# Patient Record
Sex: MALE | Race: WHITE | NOT HISPANIC OR LATINO | URBAN - METROPOLITAN AREA
[De-identification: names, ages, dates, MRNs, and addresses within clinical notes are randomized per-mention and may not be internally consistent; named-entity substitution may affect disease eponyms.]

---

## 2019-01-03 ENCOUNTER — OFFICE VISIT (OUTPATIENT)
Dept: OTOLARYNGOLOGY | Facility: CLINIC | Age: 49
End: 2019-01-03
Payer: COMMERCIAL

## 2019-01-03 VITALS — DIASTOLIC BLOOD PRESSURE: 88 MMHG | WEIGHT: 244.69 LBS | SYSTOLIC BLOOD PRESSURE: 128 MMHG | HEART RATE: 77 BPM

## 2019-01-03 DIAGNOSIS — J35.8 TONSILLAR MASS: ICD-10-CM

## 2019-01-03 DIAGNOSIS — R22.1 NECK MASS: Primary | ICD-10-CM

## 2019-01-03 PROCEDURE — 88342 IMHCHEM/IMCYTCHM 1ST ANTB: CPT | Performed by: PATHOLOGY

## 2019-01-03 PROCEDURE — 88342 CYTOLOGY SPECIMEN-FNA-PATHOLOGIST PERFORMED: ICD-10-PCS | Mod: 26,,, | Performed by: PATHOLOGY

## 2019-01-03 PROCEDURE — 88342 IMHCHEM/IMCYTCHM 1ST ANTB: CPT | Mod: 26,,, | Performed by: PATHOLOGY

## 2019-01-03 PROCEDURE — 31575 DIAGNOSTIC LARYNGOSCOPY: CPT | Mod: S$GLB,,, | Performed by: OTOLARYNGOLOGY

## 2019-01-03 PROCEDURE — 99999 PR PBB SHADOW E&M-NEW PATIENT-LVL III: ICD-10-PCS | Mod: PBBFAC,,, | Performed by: OTOLARYNGOLOGY

## 2019-01-03 PROCEDURE — 31575 PR LARYNGOSCOPY, FLEXIBLE; DIAGNOSTIC: ICD-10-PCS | Mod: S$GLB,,, | Performed by: OTOLARYNGOLOGY

## 2019-01-03 PROCEDURE — 99999 PR PBB SHADOW E&M-NEW PATIENT-LVL III: CPT | Mod: PBBFAC,,, | Performed by: OTOLARYNGOLOGY

## 2019-01-03 PROCEDURE — 99204 PR OFFICE/OUTPT VISIT, NEW, LEVL IV, 45-59 MIN: ICD-10-PCS | Mod: 25,S$GLB,, | Performed by: OTOLARYNGOLOGY

## 2019-01-03 PROCEDURE — 10021 FNA BX W/O IMG GDN 1ST LES: CPT | Mod: ,,, | Performed by: PATHOLOGY

## 2019-01-03 PROCEDURE — 88305 TISSUE EXAM BY PATHOLOGIST: CPT | Mod: 26,,, | Performed by: PATHOLOGY

## 2019-01-03 PROCEDURE — 88173 CYTOLOGY SPECIMEN-FNA-PATHOLOGIST PERFORMED: ICD-10-PCS | Mod: 26,,, | Performed by: PATHOLOGY

## 2019-01-03 PROCEDURE — 88305 TISSUE EXAM BY PATHOLOGIST: CPT | Performed by: PATHOLOGY

## 2019-01-03 PROCEDURE — 88173 CYTOPATH EVAL FNA REPORT: CPT | Mod: 26,,, | Performed by: PATHOLOGY

## 2019-01-03 PROCEDURE — 88305 CYTOLOGY SPECIMEN-FNA-PATHOLOGIST PERFORMED: ICD-10-PCS | Mod: 26,,, | Performed by: PATHOLOGY

## 2019-01-03 PROCEDURE — 99204 OFFICE O/P NEW MOD 45 MIN: CPT | Mod: 25,S$GLB,, | Performed by: OTOLARYNGOLOGY

## 2019-01-03 PROCEDURE — 10021 CYTOLOGY SPECIMEN-FNA-PATHOLOGIST PERFORMED: ICD-10-PCS | Mod: ,,, | Performed by: PATHOLOGY

## 2019-01-03 NOTE — PROCEDURES
The patient was examined and there was a palpable mass,rt neck    The patient was explained the nature of the procedure and risks, and a consent form was signed. At timeout was performed at 3:15    The skin was prepared with alcohol, and fine needle aspirate was performed. 2 passes were performed. Material was obtained, and results will follow in a separate report. The patient tolerated the procedure well.  Additional comments: none  Complications: none

## 2019-01-03 NOTE — PROGRESS NOTES
Chief Complaint   Patient presents with    Consult     tonsil CA       HPI   48 y.o. male presents from Dr. John Rockwell for evaluation of R cervical adenopathy and a R tonsil lesion.  He reports that he noted a neck mass several weeks ago.  He denies pain.  He reports that his throat is not particularly sore.  He has never smoked.  CT scan of the neck revealed R cervical LAD and tonsillar enlargement R>L.  CT scans of the chest/abdomen/pelvis were unremarkable.     Review of Systems   Constitutional: Negative for fatigue and unexpected weight change.   HENT: Per HPI.  Eyes: Negative for visual disturbance.   Respiratory: Negative for shortness of breath, hemoptysis   Cardiovascular: Negative for chest pain and palpitations.   Musculoskeletal: Negative for decreased ROM, back pain.   Skin: Negative for rash, sunburn, itching.   Neurological: Negative for dizziness and seizures.   Hematological: Negative for adenopathy. Does not bruise/bleed easily.   Endocrine: Negative for rapid weight loss/weight gain, heat/cold intolerance.     Past Medical History   There is no problem list on file for this patient.          Past Surgical History   History reviewed. No pertinent surgical history.      Family History   History reviewed. No pertinent family history.        Social History   .  Social History     Socioeconomic History    Marital status:      Spouse name: Not on file    Number of children: Not on file    Years of education: Not on file    Highest education level: Not on file   Social Needs    Financial resource strain: Not on file    Food insecurity - worry: Not on file    Food insecurity - inability: Not on file    Transportation needs - medical: Not on file    Transportation needs - non-medical: Not on file   Occupational History    Not on file   Tobacco Use    Smoking status: Never Smoker    Smokeless tobacco: Never Used   Substance and Sexual Activity    Alcohol use: Not on file    Drug use:  Not on file    Sexual activity: Not on file   Other Topics Concern    Not on file   Social History Narrative    Not on file         Allergies   Review of patient's allergies indicates:   Allergen Reactions    Amoxicillin     Penicillins            Physical Exam     Vitals:    01/03/19 1431   BP: 128/88   Pulse: 77         There is no height or weight on file to calculate BMI.      General: AOx3, NAD   Respiratory:  Symmetric chest rise, normal effort  Nose: No gross nasal septal deviation. Inferior Turbinates WNL bilaterally. No septal perforation. No masses/lesions.   Oral Cavity:  Oral Tongue mobile, no lesions noted. Hard Palate WNL. No buccal or FOM lesions.  Oropharynx:  No masses/lesions of the posterior pharyngeal wall. R tonsil enlarged relative to L. Soft palate without masses. Midline uvula.   Neck: No scars.  Multiple enlarged, mobile R level II and III nodes.   No thyromegaly or thyroid nodules.  Normal range of motion.    Face: House Brackmann I bilaterally.     Flex Naso Sarah Hypo Procedures #2    Procedure:  Diagnostic flexible nasopharyngoscopy, laryngoscopy and hypopharyngoscopy:    Routine preparation with local atomizer with 1% neosynephrine/pontocaine with customary flexible endoscope.    Nasopharynx:  No lesions.   Mucosa:  No lesions.   Adenoids:  Present.  Posterior Choanae:  Patent.  Eustachian Tubes:  Patent.  Posterior pharynx:  No lesions.  Larynx/hypopharynx:   Epiglottis:  No lesions, without edema.   AE Folds:  No lesions.   Vocal cords:  No polyps, nodules, ulcers or lesions.   Mobility:  Equal and normal bilateral.   Hypopharynx:  No lesions.   Piriform sinus:  No pooling, no lesions.   Post Cricoid:  No erythema, no edema.    Additional Findings: R tonsil mass extending from nasal surface of soft palate to R BOT.     CT neck reviewed    Assessment/Plan  Problem List Items Addressed This Visit        ENT    Neck mass - Primary    Relevant Orders    Cytology  Specimen-FNA-Pathologist Performed    Tonsillar mass     R tonsil lesion and multiple enlarged R cervical nodes worrisome for HPV+ SCCA.  FNA of the R neck performed today. I suspect that the extent of disease is too great for TORS and that, once the diagnosis is confirmed, I will recommend CRT.  I will contact him with FNA results.

## 2019-01-03 NOTE — ASSESSMENT & PLAN NOTE
R tonsil lesion and multiple enlarged R cervical nodes worrisome for HPV+ SCCA.  FNA of the R neck performed today. I suspect that the extent of disease is too great for TORS and that, once the diagnosis is confirmed, I will recommend CRT.  I will contact him with FNA results.

## 2019-01-03 NOTE — LETTER
January 3, 2019      Obdulio Rockwell MD  [address] [place] [address]  [facility]  [place] [address]           [facility]  [address]  [facility] [address]  Phone: [phone]  Fax: [phone]          Patient: Mason Reyes   MR Number: [id number]   YOB: 1970   Date of Visit: 1/3/2019       Dear Dr. Obdulio Rockwell:    Thank you for referring Mason Reyes to me for evaluation. Attached you will find relevant portions of my assessment and plan of care.    If you have questions, please do not hesitate to call me. I look forward to following Mason Reyes along with you.    Sincerely,    Santiago Qureshi MD    Enclosure  CC:  No Recipients    If you would like to receive this communication electronically, please contact [email or web address] or [phone] to request more information on [facility] access.    For providers and/or their staff who would like to refer a patient to [facility], please contact us through our one-stop-shop provider referral line, [facility] [place], at [phone].    If you feel you have received this communication in error or would no longer like to receive these types of communications, please e-mail [email or web address]

## 2019-01-08 ENCOUNTER — TELEPHONE (OUTPATIENT)
Dept: OTOLARYNGOLOGY | Facility: CLINIC | Age: 49
End: 2019-01-08

## 2019-01-08 NOTE — TELEPHONE ENCOUNTER
----- Message from Radha Colin sent at 1/8/2019 11:37 AM CST -----  Contact: patient  Please call above patient at [phone]-need to speak with the doctor waiting on a call back thanks

## 2019-01-08 NOTE — TELEPHONE ENCOUNTER
Returned pt's phone call and answered his questions. Also reached out to [facility] Rad/Onc and Hem/Onc clinics to confirm they received his faxed referrals (they did). They stated they will give him a call.

## 2019-01-18 ENCOUNTER — TELEPHONE (OUTPATIENT)
Dept: OTOLARYNGOLOGY | Facility: CLINIC | Age: 49
End: 2019-01-18

## 2019-01-18 NOTE — TELEPHONE ENCOUNTER
----- Message from Radha Colin sent at 1/18/2019 11:05 AM CST -----  Contact: patient   Please call above patient at [phone] has a couple of questions for the nurse waiting on a call back thanks

## 2019-01-21 ENCOUNTER — TELEPHONE (OUTPATIENT)
Dept: OTOLARYNGOLOGY | Facility: CLINIC | Age: 49
End: 2019-01-21

## 2019-01-21 NOTE — TELEPHONE ENCOUNTER
----- Message from Santiago Qureshi MD sent at 1/18/2019  2:37 PM CST -----  Contact: patient   It depends on the chemo agent.  I am not sure of their plan and can't speak to hair loss.  It is reasonable to say that the most commonly used drugs don't cause hair loss. 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  I am OK with the PICC.   ----- Message -----  From: Loida Mccallum RN  Sent: 1/18/2019   2:28 PM  To: Santiago Qureshi MD    His questions:   He states you stated chemo would not cause hair loss, and the chemo md said it would possibly.  The chemo md wants to place a PICC line.  He wanted your ok with that plan.  Wanted to know if HPV results back yet.  I said it appeared not yet, but would send a message asking.  Finally, had PET done today and wanted results, however, was done at [facility] in [place], so informed him we would not have results to view. Instructed he request a copy of the disc be FedEx'ed to you.    -[place]    ----- Message -----  From: Radha Colin  Sent: 1/18/2019  11:05 AM  To: [facility] Staff    Please call above patient at [phone] has a couple of questions for the nurse waiting on a call back thanks